# Patient Record
Sex: FEMALE | Race: BLACK OR AFRICAN AMERICAN | NOT HISPANIC OR LATINO | Employment: STUDENT | URBAN - METROPOLITAN AREA
[De-identification: names, ages, dates, MRNs, and addresses within clinical notes are randomized per-mention and may not be internally consistent; named-entity substitution may affect disease eponyms.]

---
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
## 2024-02-25 ENCOUNTER — HOSPITAL ENCOUNTER (EMERGENCY)
Facility: OTHER | Age: 19
Discharge: HOME | End: 2024-02-25
Attending: EMERGENCY MEDICINE
Payer: MEDICAID

## 2024-02-25 VITALS
SYSTOLIC BLOOD PRESSURE: 130 MMHG | RESPIRATION RATE: 18 BRPM | DIASTOLIC BLOOD PRESSURE: 70 MMHG | OXYGEN SATURATION: 97 % | HEART RATE: 101 BPM | TEMPERATURE: 99 F

## 2024-02-25 DIAGNOSIS — R41.82 ALTERED MENTAL STATUS, UNSPECIFIED ALTERED MENTAL STATUS TYPE: Primary | ICD-10-CM

## 2024-02-25 DIAGNOSIS — F19.10 DRUG ABUSE: ICD-10-CM

## 2024-02-25 LAB — POCT GLUCOSE: 73 MG/DL (ref 70–110)

## 2024-02-25 PROCEDURE — 82962 GLUCOSE BLOOD TEST: CPT

## 2024-02-25 PROCEDURE — 99283 EMERGENCY DEPT VISIT LOW MDM: CPT

## 2024-02-26 NOTE — ED NOTES
Pt's father, Ebenezer, contacted at pt's request ([phone]), stated he would be able to provide transportation home for pt. Pt informed that father would be coming from [place] to  pt, pt becoming increasing belligerent with ED staff and security, using foul language and using strong, angulated body gestures, pointing at staff members and failing arms. Pt demanding to be let outside, unable to redirect pt's behavior with repeated verbal commands, let out by ED security. ED MD aware. Pt refusing all d/c teaching and VS. Pt was ambulatory with even and steady gait, NAD noted, RR even and unlabored. Pt is discharged.
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
